# Patient Record
Sex: FEMALE | Race: OTHER | HISPANIC OR LATINO | URBAN - METROPOLITAN AREA
[De-identification: names, ages, dates, MRNs, and addresses within clinical notes are randomized per-mention and may not be internally consistent; named-entity substitution may affect disease eponyms.]

---

## 2019-12-29 ENCOUNTER — EMERGENCY (EMERGENCY)
Facility: HOSPITAL | Age: 29
LOS: 1 days | Discharge: HOME | End: 2019-12-29
Attending: EMERGENCY MEDICINE
Payer: MEDICAID

## 2019-12-29 VITALS
RESPIRATION RATE: 18 BRPM | DIASTOLIC BLOOD PRESSURE: 64 MMHG | WEIGHT: 160.06 LBS | OXYGEN SATURATION: 97 % | TEMPERATURE: 100 F | HEART RATE: 118 BPM | SYSTOLIC BLOOD PRESSURE: 128 MMHG | HEIGHT: 63 IN

## 2019-12-29 DIAGNOSIS — D25.9 LEIOMYOMA OF UTERUS, UNSPECIFIED: Chronic | ICD-10-CM

## 2019-12-29 PROCEDURE — 99283 EMERGENCY DEPT VISIT LOW MDM: CPT

## 2019-12-29 RX ORDER — ACETAMINOPHEN 500 MG
975 TABLET ORAL ONCE
Refills: 0 | Status: COMPLETED | OUTPATIENT
Start: 2019-12-29 | End: 2019-12-29

## 2019-12-29 RX ADMIN — Medication 975 MILLIGRAM(S): at 22:29

## 2019-12-29 NOTE — ED PROVIDER NOTE - PHYSICAL EXAMINATION
NAD, Tachycardia Mild febrile, + Nasal congestion without sinus tender. Normal throat, Neck supple without lymphadenopathy. Lungs clear. ABD soft, non tender, FHS- 160, No CVA tender. Neuro- intact.

## 2019-12-29 NOTE — CONSULT NOTE ADULT - ATTENDING COMMENTS
/Attendiny/o  at 38.4wks who presents to the ED with c/o chills, coughing, nasal congestion, body aches, and frontal headache.    Pt was discussed with me and I have read and reviewed the above Resident's note and I agree with the assessment and plan.    Pt with mild tachycardia, but has been afebrile.  NST: reactive w/ FHR: 125bpm  Pt with +Influenza and Coronavirus.  Recommend Oseltamivir 75mg BID x5 days and treatment for all close contacts and Flu vaccines for any family member that has not been vaccinated.  Pt to inc. PO hydrations and to f/u in OB clinic.    Thank you for the consult.  Please call if any further questions.    Dr. Frankel

## 2019-12-29 NOTE — ED ADULT NURSE REASSESSMENT NOTE - NS ED NURSE REASSESS COMMENT FT1
called L&D ext [phone]; discussed case; pt reports cough since yesterday with abd pain with cough; L&D advised to see pt in ED first since no OB complaints, then to L&D for check.

## 2019-12-29 NOTE — ED PROVIDER NOTE - ATTENDING CONTRIBUTION TO CARE
I have seen and evaluated this patient with the [facility] practice clinician.   I agree with the findings  unless other wise stated. I have amended notes where needed.  After my face to face bedside evaluation, I am notinyo female pt, 38weeks preg (GP- 1,0), ambulatory c/o nasal congestion, dry cough and bodyache since yesterday. Pt also stated head and abdomen hurt while coughing. Denies fever, chills or ear pain/ sore throat. Denies neck or back pain. Denies CP/SOB or N/V/D. Denies vaginal discharge or bleeding. Denies urinary problems. Denies sensory changes or weakness to extremities. She is now f/u with her OB every Tuesday in [place], [place].. No flu shot this year. non smoker . Alert no distress throat erythema clear lungs Abdomen gravid 38 weeks No signs of meningeal irritation no rash likely flu related Labs and fetal monitoring findings reviewed flu on swab exam. will be  followed with OB  Compliance to diet and medicines stressed will have out patient follow up. Return instructions discussed with patient and patient understood --Tj

## 2019-12-29 NOTE — CONSULT NOTE ADULT - ASSESSMENT
30yo  @ 38+3wks, presents w/ URI symptoms x1 day. No OB complaints. Symptoms likely 2/2 a viral syndrome.    -Recommend RVP   -If influenza positive, please administer Oseltamivir 75mg BID x5 days  -F/u w/ OB as routinely scheduled     D/w Dr. Jostin Hester PGY-2 28yo  @ 38+3wks, presents w/ URI symptoms x1 day. No OB complaints. Symptoms likely 2/2 a viral syndrome.    -RVP positive for influenza and Coronavirus  -Please administer Oseltamivir 75mg BID x5 days  -Close contacts should also be treated  -Encourage PO hydration  -F/u w/ OB as routinely scheduled     D/w Dr. Jostin Hester PGY-2

## 2019-12-29 NOTE — CONSULT NOTE ADULT - SUBJECTIVE AND OBJECTIVE BOX
R2 GYN ED CONSULT NOTE    HPI:  28yo  @ 38+3wks GA by WM 20, presents c/o cold symptoms since yesterday. Patient endorses chills, coughing, nasal congestion, body aches, and frontal headache. Denies fever, n/v, dysuria. No sick contacts. No one else at home is sick. She has no OB complaints. Denies VB, LOF, Ctx. FM+.     In ED today she received 975mg of Tylenol for her headache.    Primary OB/GYN:  [address] [facility] [place] ([facility])    OBH: current  GYNH: +fibroids status post D&C hysteroscopy 2018; denies ov cysts, abnl paps, sti  PMSH: D*C hysteroscopy  MEDS: PNV  ALL: nkda  SOCH: denies tobacco/drugs/alcohol    ROS: negative except per hpi    OBJECTIVE:    VITAL SIGNS:  Vital Signs Last 24 Hrs  T(C): 37.7 (29 Dec 2019 22:25), Max: 37.7 (29 Dec 2019 20:17)  T(F): 99.8 (29 Dec 2019 22:25), Max: 99.8 (29 Dec 2019 20:17)  HR: 105 (29 Dec 2019 22:25) (105 - 118)  BP: 128/64 (29 Dec 2019 20:17) (128/64 - 128/64)  BP(mean): --  RR: 18 (29 Dec 2019 22:25) (18 - 18)  SpO2: 97% (29 Dec 2019 22:25) (97% - 97%)  Height (cm): 160.02 (19 @ 20:17)  Weight (kg): 72.6 (19 @ 20:17)  BMI (kg/m2): 28.4 (19 @ 20:17)  BSA (m2): 1.76 (19 @ 20:17)  CAPILLARY BLOOD GLUCOSE        PHYSICAL EXAM:  GEN: NAD, A&Ox3  CV: RRR,  LUNGS: CTAB  ABD: soft, NT, gravid  PELVIC: deferred  EXT: No LE edema/tenderness    LABS:  RVP pending R2 GYN ED CONSULT NOTE    HPI:  28yo  @ 38+3wks GA by WM 20, presents c/o cold symptoms since yesterday. Patient endorses chills, coughing, nasal congestion, body aches, and frontal headache. Denies fever, n/v, dysuria. No sick contacts. No one else at home is sick. She has no OB complaints. Denies VB, LOF, Ctx. FM+.     In ED today she received 975mg of Tylenol for her headache.    Primary OB/GYN:  [address] [facility] ([facility])    OBH: current  GYNH: +fibroids status post D&C hysteroscopy 2018; denies ov cysts, abnl paps, sti  PMSH: D*C hysteroscopy  MEDS: PNV  ALL: nkda  SOCH: denies tobacco/drugs/alcohol    ROS: negative except per hpi    OBJECTIVE:    VITAL SIGNS:  Vital Signs Last 24 Hrs  T(C): 37.7 (29 Dec 2019 22:25), Max: 37.7 (29 Dec 2019 20:17)  T(F): 99.8 (29 Dec 2019 22:25), Max: 99.8 (29 Dec 2019 20:17)  HR: 105 (29 Dec 2019 22:25) (105 - 118)  BP: 128/64 (29 Dec 2019 20:17) (128/64 - 128/64)  BP(mean): --  RR: 18 (29 Dec 2019 22:25) (18 - 18)  SpO2: 97% (29 Dec 2019 22:25) (97% - 97%)  Height (cm): 160.02 (19 @ 20:17)  Weight (kg): 72.6 (19 @ 20:17)  BMI (kg/m2): 28.4 (19 @ 20:17)  BSA (m2): 1.76 (19 @ 20:17)        PHYSICAL EXAM:  GEN: NAD, A&Ox3  CV: RRR,  LUNGS: CTAB  ABD: soft, NT, gravid  PELVIC: deferred  EXT: No LE edema/tenderness  NST: reactive (signed by Dr. Vargas PGY-3)    LABS:  RVP: Influenza+, Coronavirus+

## 2019-12-29 NOTE — ED PROVIDER NOTE - PATIENT PORTAL LINK FT
You can access the FollowMyHealth Patient Portal offered by [facility] by registering at the following website: [email or web address]. By joining [facility]’s FollowMyHealth portal, you will also be able to view your health information using other applications (apps) compatible with our system.

## 2019-12-29 NOTE — ED PROVIDER NOTE - NSFOLLOWUPINSTRUCTIONS_ED_ALL_ED_FT
You've seen in ED for positive for influenza and Coronavirus  Take Tamiflu 75mg every 12 hours x5 days  Close contacts should also be treated.  Hydrate  Follow up with your OB scheduled on Tuesday.

## 2019-12-29 NOTE — ED ADULT NURSE NOTE - OBJECTIVE STATEMENT
Pt presents for eval of subjective fever, dry cough, clear nasal discharge, body aches and frontal h/a when she coughs.  Lungs clear with good air entry.  Oral mucosa slightly dry, not cracked.  Denies urinary.

## 2019-12-29 NOTE — ED PROVIDER NOTE - CLINICAL SUMMARY MEDICAL DECISION MAKING FREE TEXT BOX
30yo female pt, 38weeks preg (GP- 1,0), ambulatory c/o nasal congestion, dry cough and bodyache since yesterday. Pt also stated head and abdomen hurt while coughing. Denies fever, chills or ear pain/ sore throat. Denies neck or back pain. Denies CP/SOB or N/V/D. Denies vaginal discharge or bleeding. Denies urinary problems. Denies sensory changes or weakness to extremities. She is now f/u with her OB every Tuesday in [place], [place].. No flu shot this year. non smoker . Alert no distress throat erythema clear lungs Abdomen gravid 38 weeks No signs of meningeal irritation no rash likely flu related Labs and fetal monitoring findings reviewed flu on swab exam. will be  followed with OB  Compliance to diet and medicines stressed will have out patient follow up. Return instructions discussed with patient and patient understood --Tj

## 2019-12-29 NOTE — ED PROVIDER NOTE - OBJECTIVE STATEMENT
30yo female pt, 38weeks preg (GP- 1,0), ambulatory c/o nasal congestion, dry cough and bodyache since yesterday. Pt also stated head and abdomen hurt while coughing. Denies fever, chills or ear pain/ sore throat. Denies neck or back pain. Denies CP/SOB or N/V/D. Denies vaginal discharge or bleeding. Denies urinary problems. Denies sensory changes or weakness to extremities. She is now f/u with her OB every Tuesday in [place], [place].

## 2019-12-30 VITALS
HEART RATE: 93 BPM | RESPIRATION RATE: 18 BRPM | DIASTOLIC BLOOD PRESSURE: 71 MMHG | SYSTOLIC BLOOD PRESSURE: 104 MMHG | OXYGEN SATURATION: 98 % | TEMPERATURE: 98 F

## 2019-12-30 LAB
FLUBV RNA SPEC QL NAA+PROBE: DETECTED
HCOV PNL SPEC NAA+PROBE: DETECTED
RAPID RVP RESULT: DETECTED

## 2019-12-30 PROCEDURE — 87581 M.PNEUMON DNA AMP PROBE: CPT

## 2019-12-30 PROCEDURE — 99282 EMERGENCY DEPT VISIT SF MDM: CPT

## 2019-12-30 PROCEDURE — 87798 DETECT AGENT NOS DNA AMP: CPT

## 2019-12-30 PROCEDURE — 87633 RESP VIRUS 12-25 TARGETS: CPT

## 2019-12-30 PROCEDURE — 99283 EMERGENCY DEPT VISIT LOW MDM: CPT

## 2019-12-30 PROCEDURE — 87486 CHLMYD PNEUM DNA AMP PROBE: CPT

## 2019-12-30 RX ADMIN — Medication 75 MILLIGRAM(S): at 00:39

## 2019-12-30 RX ADMIN — Medication 975 MILLIGRAM(S): at 00:40

## 2024-07-08 NOTE — ED PROVIDER NOTE - PROGRESS NOTE DETAILS
How Severe Are Your Spot(S)?: mild What Is The Reason For Today's Visit?: Upper Body Skin Exam Fetal monitor at bedside completed.